# Patient Record
Sex: FEMALE | Race: BLACK OR AFRICAN AMERICAN | Employment: FULL TIME | URBAN - METROPOLITAN AREA
[De-identification: names, ages, dates, MRNs, and addresses within clinical notes are randomized per-mention and may not be internally consistent; named-entity substitution may affect disease eponyms.]

---

## 2017-07-20 PROBLEM — N91.2 AMENORRHEA: Status: ACTIVE | Noted: 2017-07-20

## 2018-04-19 PROBLEM — L03.90 CELLULITIS: Status: ACTIVE | Noted: 2018-04-19

## 2021-10-09 PROBLEM — R73.9 HYPERGLYCEMIA: Status: ACTIVE | Noted: 2021-10-09

## 2022-10-25 ENCOUNTER — TELEPHONE (OUTPATIENT)
Dept: INTERNAL MEDICINE | Age: 32
End: 2022-10-25

## 2022-10-25 NOTE — LETTER
[address] [address]  Phone: [phone]  Fax: [phone]    David Treadwell MD        October 25, 2022     [address] [place] [address]      Dear Silvester Riedel: We missed seeing you for a scheduled appointment at [address] [place] [address] with David Treadwell MD on 10/25/2022. We're sorry you were unable to keep your appointment and hope that you are doing well. We ask that you please call 24 hours in advance if you are unable to make your appointment, so that we can give that time to another patient in need. We care about you and the management of your healthcare and want to make sure that you follow up as recommended. To provide quality care and timely appointments to all our patients, you may be dismissed from the practice if you do not show for three (3) scheduled appointments within a 12-month period. We would like to continue treating your healthcare needs. Please call the office to reschedule your appointment, if needed.      Sincerely,        David Treadwell MD

## 2022-11-04 VITALS
OXYGEN SATURATION: 98 % | TEMPERATURE: 96.3 F | DIASTOLIC BLOOD PRESSURE: 94 MMHG | HEART RATE: 97 BPM | SYSTOLIC BLOOD PRESSURE: 156 MMHG | RESPIRATION RATE: 16 BRPM

## 2022-11-04 PROCEDURE — 99284 EMERGENCY DEPT VISIT MOD MDM: CPT

## 2022-11-05 ENCOUNTER — APPOINTMENT (OUTPATIENT)
Dept: CT IMAGING | Age: 32
End: 2022-11-05
Payer: COMMERCIAL

## 2022-11-05 ENCOUNTER — HOSPITAL ENCOUNTER (EMERGENCY)
Age: 32
Discharge: HOME | End: 2022-11-05
Attending: EMERGENCY MEDICINE
Payer: COMMERCIAL
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
## 2022-11-05 PROCEDURE — 70450 CT HEAD/BRAIN W/O DYE: CPT

## 2022-11-05 PROCEDURE — 6370000000 HC RX 637 (ALT 250 FOR IP): Performed by: STUDENT IN AN ORGANIZED HEALTH CARE EDUCATION/TRAINING PROGRAM

## 2022-11-05 RX ORDER — METOCLOPRAMIDE HYDROCHLORIDE 5 MG/ML
10 INJECTION INTRAMUSCULAR; INTRAVENOUS ONCE
Status: DISCONTINUED | OUTPATIENT
Start: 2022-11-05 | End: 2022-11-05

## 2022-11-05 RX ORDER — ACETAMINOPHEN 500 MG
1000 TABLET ORAL ONCE
Status: COMPLETED | OUTPATIENT
Start: 2022-11-05 | End: 2022-11-05

## 2022-11-05 RX ORDER — DIPHENHYDRAMINE HYDROCHLORIDE 50 MG/ML
25 INJECTION INTRAMUSCULAR; INTRAVENOUS ONCE
Status: DISCONTINUED | OUTPATIENT
Start: 2022-11-05 | End: 2022-11-05

## 2022-11-05 RX ADMIN — ACETAMINOPHEN 1000 MG: 500 TABLET ORAL at 01:27

## 2022-11-05 ASSESSMENT — PAIN DESCRIPTION - PAIN TYPE: TYPE: ACUTE PAIN

## 2022-11-05 ASSESSMENT — ENCOUNTER SYMPTOMS
ANAL BLEEDING: 0
DIARRHEA: 0
SHORTNESS OF BREATH: 0
RHINORRHEA: 0
NAUSEA: 0
SORE THROAT: 0
CONSTIPATION: 0
PHOTOPHOBIA: 0
VOMITING: 0
CHEST TIGHTNESS: 0
ABDOMINAL DISTENTION: 0

## 2022-11-05 ASSESSMENT — PAIN SCALES - GENERAL
PAINLEVEL_OUTOF10: 6
PAINLEVEL_OUTOF10: 8

## 2022-11-05 ASSESSMENT — PAIN DESCRIPTION - LOCATION
LOCATION: HEAD
LOCATION: HEAD

## 2022-11-05 ASSESSMENT — PAIN DESCRIPTION - DESCRIPTORS: DESCRIPTORS: THROBBING

## 2022-11-05 NOTE — ED PROVIDER NOTES
[facility] ED  Emergency Department Encounter  EmergencyMedicine Resident     Pt Becca Paulino  MRN: [id number]  [place] 1990  Date of evaluation: 11/5/22  PCP:  Chandan Schroeder MD    [address] [place] [address]       Chief Complaint   Patient presents with    Darnella Ready in bathroom yesterday night and hit right side of head on sink. Unsure of LOC. States feeling tired ever since. Fatigue       HISTORY OF PRESENT ILLNESS  (Location/Symptom, Timing/Onset, Context/Setting, Quality, Duration, Modifying Factors, Severity.)      Dale Demarco is a 27 y.o. female who presents with severe right-sided headache after a fall yesterday. Patient does not remember the fall very well. States she was on the bathroom and fell and hit her head she thinks she may have lost consciousness. She denies any nausea or vomiting. Denies anticoagulation use. Denies any loss strength or sensation or neck pain or pain anywhere else. She denies diplopia, dysphagia, dysarthria, dysmetria, ataxia    PAST MEDICAL / SURGICAL / SOCIAL / FAMILY HISTORY      has a past medical history of Anxiety, Difficult intravenous access, Gestational diabetes, Headache, History of stomach ulcers, Obesity, Pneumonia, Type 2 diabetes mellitus with hyperglycemia, without long-term current use of insulin ([facility] [address] Wears glasses, Wound disruption, post-op, skin, and Wound infection. has a past surgical history that includes cyst removal (Right, 03/30/2018); pr exc tumor soft tissue foot/toe subfasc <1.5cm (Right, 3/30/2018); pr office/outpt visit,procedure only (Right, 5/4/2018); Hand tendon surgery (Right, 06/12/2020); Hand surgery (Right, 06/12/2020); and Hand surgery (Right, 6/12/2020).       Social History     Socioeconomic History    Marital status:      Spouse name: Not on file    Number of children: Not on file    Years of education: Not on file    Highest education level: Not on file   Occupational History    Not on file   Tobacco Use    Smoking status: Former     Packs/day: 0.50     Years: 2.00     Pack years: 1.00     Types: Cigarettes     Start date: 2018     Quit date: 2020     Years since quittin.3    Smokeless tobacco: Never   Vaping Use    Vaping Use: Never used   Substance and Sexual Activity    Alcohol use: Yes     Comment: occasional     Drug use: No    Sexual activity: Not on file   Other Topics Concern    Not on file   Social History Narrative    Not on file     Social Determinants of Health     Financial Resource Strain: Not on file   Food Insecurity: Not on file   Transportation Needs: Not on file   Physical Activity: Not on file   Stress: Not on file   Social Connections: Not on file   Intimate Partner Violence: Not on file   Housing Stability: Not on file       Family History   Problem Relation Age of Onset    Diabetes Mother     Diabetes Father     High Blood Pressure Father     Hypertension Maternal Grandmother     Diabetes Maternal Grandmother        Allergies:  Aspirin, Motrin [ibuprofen], Nsaids, and Adhesive tape    Home Medications:  Prior to Admission medications    Medication Sig Start Date End Date Taking? Authorizing Provider   blood glucose monitor kit and supplies Dispense sufficient amount for indicated testing frequency plus additional to accommodate PRN testing needs. Dispense all needed supplies to include: monitor, strips, lancing device, lancets, control solutions, alcohol swabs.  22   Nandini Montalvo MD   blood glucose monitor strips Test__3_times daily    Diagnosis: 250.0   Diabetes Mellitus____Insulin Dependent____Non-Insulin Dependent 22   Cory Calixto MD   Lancets MISC 1 each by Does not apply route daily Use_3-4__times daily    Diagnisis:250.0  Diabetes Mellitus____Insulin Dependent 22   Cory Calixto MD   insulin lispro, 1 Unit Dial, 100 UNIT/ML SOPN inject PER SLIDING SCALE  FOR BLOOD GLUCOSE 125-150  2 UNITS 151-200 4 UNITS 201-250 6 UNITS 251-3008 UNITS 301-350 10 UNITS 351- 400 12 UNITS MAXIMUM DAILY DOSE OF 36 units 8/19/22   Cory Calixto MD   blood glucose test strips (FREESTYLE LITE) strip 1 each by In Vitro route daily As needed. 8/4/22   [address], MD   glucose monitoring (FREESTYLE FREEDOM) kit 1 kit by Does not apply route daily 8/3/22   Cory Calixto MD   FreeStyle Lancets MISC 1 each by Does not apply route daily 8/3/22   Cory Calixto MD   lisinopril (PRINIVIL;ZESTRIL) 5 MG tablet Take 0.5 tablets by mouth in the morning. Do not take if pregnancy is planned or suspected. . 7/19/22   Kelby Castellon MD   Dulaglutide (TRULICITY) 1.5 UN/4.7BU SOPN Inject 1.5 mg into the skin once a week 7/19/22   Kelby Castellon MD   insulin glargine (LANTUS SOLOSTAR) 100 UNIT/ML injection pen Inject 35 Units into the skin in the morning and 35 Units before bedtime.  7/19/22   Kelby Castellon MD   Continuous Blood Gluc Sensor (FREESTYLE YASMANY 14 DAY SENSOR) MISC 1 each by Does not apply route daily 7/19/22   Kelby Castellon MD   Continuous Blood Gluc  (FREESTYLE YASMANY 14 DAY READER) MIAN 1 each by Does not apply route daily 7/19/22   Kelby Castellon MD   Blood Pressure KIT As directed  Patient not taking: Reported on 7/19/2022 4/19/22   Kelby Castellon MD   metroNIDAZOLE (FLAGYL) 500 MG tablet  3/21/22   Historical Provider, MD   albuterol sulfate HFA (VENTOLIN HFA) 108 (90 Base) MCG/ACT inhaler Inhale 2 puffs into the lungs 4 times daily as needed for Wheezing 12/21/21   Erin Simental DO   blood glucose test strips (TRUE METRIX BLOOD GLUCOSE TEST) strip 1 each by In Vitro route 3 times daily To check blood glucose at least 3 times daily 12/7/21   Kavita Garces MD   Nutritional Supplements (GLUCOSE MANAGEMENT) TABS Take 1 each by mouth as needed (hypoglycemia)  Patient not taking: No sig reported 12/7/21   Kavita Garces MD   Insulin Pen Needle 29G X 12MM MISC 1 each by Does not apply route daily 11/8/21   Cory Alonso MD   loratadine (CLARITIN) 10 MG tablet Take 1 tablet by mouth daily 10/19/21   Leena Contreras MD   Lancets MISC 1 each by Does not apply route 2 times daily 7/29/21   Alex Rosa MD   SUMAtriptan (IMITREX) 100 MG tablet Take 1 tablet by mouth once as needed for Migraine (Take only for severe HA when Naprosyn does not alleviate the pain. To take on the onset of the migraine, may repeat after 1 hour. No more than 2 per day. No more than 4 days/week.) 1/28/21 4/19/22  Camilla Crawley MD   fluticasone [facility]) 50 MCG/ACT nasal spray 1 spray by Nasal route daily 1/3/18   Alex Rosa MD       REVIEW OF SYSTEMS    (2-9 systems for level 4, 10 or more for level 5)      Review of Systems   Constitutional:  Negative for chills and fever. HENT:  Negative for rhinorrhea and sore throat. Eyes:  Negative for photophobia. Respiratory:  Negative for chest tightness and shortness of breath. Cardiovascular:  Negative for chest pain. Gastrointestinal:  Negative for abdominal distention, anal bleeding, constipation, diarrhea, nausea and vomiting. Endocrine: Negative for polyuria. Genitourinary:  Negative for difficulty urinating and flank pain. Musculoskeletal:  Negative for arthralgias. Skin:  Negative for rash. Neurological:  Positive for headaches. Negative for dizziness, tremors, seizures, syncope, facial asymmetry, speech difficulty, weakness, light-headedness and numbness.      PHYSICAL EXAM   (up to 7 for level 4, 8 or more for level 5)      INITIAL VITALS:   BP (!) 156/94   Pulse 97   Temp (!) 96.3 °F (35.7 °C)   Resp 16   SpO2 98%     Physical Exam  HENT:      Head:      Comments: Mild right-sided temporal tenderness     Right Ear: Tympanic membrane normal.      Left Ear: Tympanic membrane normal.      Nose: Nose normal.      Mouth/Throat:      Mouth: Mucous membranes are moist.   Eyes:      Extraocular Course User Index  [ZE] Ayla Coe DO       PROCEDURES:  Procedures     CONSULTS:  None    CRITICAL CARE:  None    MDM  Patient with a fall and head injury CT scan obtained negative. Patient neurologically intact nontoxic discharged home    MIPS 415    The patient has one or more of the following conditions that are excluded from the measure (select all that apply) :  Patient has a ventricular shunt: No  Patient has a brain tumor: No  Patient has multi-system trauma: No  Patient is pregnant: No  Patient is taking an antiplatelet medication (excluding aspirin): No  Patient is 72years old or older: No  CT scan ordered for reasons other than trauma: No  A head CT was ordered, but not by an emergency care provider: No    Patient is 25 or older, presenting with minor blunt head trauma. Head CT (including cosigned orders) was ordered by an emergency care clinician for trauma because (select one or more): [Satisfies MIPS]    Patients GCS was less than 15: No  Focal neurological deficit: No  Severe Headache: Yes  Vomiting: No  Physical signs of a basilar skull fracture: No  Coagulopathy: No  Thrombocytopenia: No  Patient suspected of taking an anticoagulant medication: No  Severe or Dangerous mechanism of injury (Select one or more):    MVA with patient ejection, death of another passenger, rollover, speed > 40mph, airbag deployment,  or passenger on ATV or motorcycle: No   Pedestrian or bicyclist without helmet: struck by motorized vehicle, in bicycle crash: No  Fall > 3 feet or 5 stairs: Yes  Head struck by high-impact object (hammer, baseball, baseball bat, heavy object such as falling brick): No       Patient had loss of consciousness OR posttraumatic amnesia AND:   Headache: Yes  Patient is 61years old or older: No  Drug or Alcohol intoxication: No  Short-term memory deficits: No  Evidence of trauma above the clavicles (any visible or detected trauma to the head or neck, including lacerations, abrasions, bruising, swelling or fracture): No  Post-traumatic seizure: No        FINAL IMPRESSION      1.  Fall, initial encounter          DISPOSITION / PLAN     DISPOSITION Decision To Discharge 11/05/2022 02:37:06 AM      PATIENT REFERRED TO:  [facility]  [address] [place] [address]  [phone]    If symptoms worsen    DISCHARGE MEDICATIONS:  New Prescriptions    No medications on file       Grazyna Culp DO  Emergency Medicine Resident    (Please note that portions of thisnote were completed with a voice recognition program.  Efforts were made to edit the dictations but occasionally words are mis-transcribed.)        Korey Tello DO  Resident  11/05/22 [id number] Toyin Hughes DO  Resident  11/05/22 [id number]

## 2022-11-05 NOTE — ED NOTES
Pt reports fall yesterday at home, striking head on bathroom sink  Reports unknown LOC  States headache today and increased tiredness  +light sensitivity   Neuro x4 intact  +perrl x2  Significant other at bedside  Call light in reach  Lights dimmed for comfort     Gerardo Turner RN  11/05/22 0144

## 2022-11-05 NOTE — ED PROVIDER NOTES
[facility] ED     Emergency Department     Faculty Attestation        I performed a history and physical examination of the patient and discussed management with the resident. I reviewed the residents note and agree with the documented findings and plan of care. Any areas of disagreement are noted on the chart. I was personally present for the key portions of any procedures. I have documented in the chart those procedures where I was not present during the key portions. I have reviewed the emergency nurses triage note. I agree with the chief complaint, past medical history, past surgical history, allergies, medications, social and family history as documented unless otherwise noted below. For Physician Assistant/ Nurse Practitioner cases/documentation I have personally evaluated this patient and have completed at least one if not all key elements of the E/M (history, physical exam, and MDM). Additional findings are as noted. Vital Signs: BP: (!) 156/94  Heart Rate: 97  Resp: 16  Temp: (!) 96.3 °F (35.7 °C) SpO2: 98 %  PCP:  Martha Rm MD    Pertinent Comments:     Patient is a 61-year-old female who for the last 24 hours fell and hit her head with positive loss of consciousness. Was nauseated earlier but that is resolved however persistent headache specifically on the right has persisted. Denies any neck pain or numbness/tingling extremities or any loss of bowel or bladder function or weakness. No tenderness to palpation of the C-spine and lungs are clear to station bilateral with midline trachea with heart regular rate and rhythm abdomen soft/nontender. Neurologically patient is intact with strength 5/5 bilateral upper and lower extremities in both proximal and distal flexors and extensors. Sensation light touch intact bilaterally as well as finger-nose intact bilaterally.    Cranial nerves II through XII and normal bilaterally with no hemotympanum on examination either. Assessment/plan: Patient with head injury after mechanical fall and positive LOC. Will obtain CT head as well as attempt symptomatic control and reevaluate after. CT scan of the brain was ordered after minor head trauma of less than 24 hours with a GCS of 15 due to: Loss of consciousness and injury (bruises, laceration, abrasion, swelling) noted above the clavicle. Critical Care  None      (Please note that portions of this note were completed with a voice recognition program. Efforts were made to edit the dictations but occasionally words are mis-transcribed.  Whenever words are used in this note in any gender, they shall be construed as though they were used in the gender appropriate to the circumstances; and whenever words are used in this note in the singular or plural form, they shall be construed as though they were used in the form appropriate to the circumstances.)    MD Niurka Mckenzie  Attending Emergency Medicine Physician           Darleen Babin MD  11/05/22 2263

## 2022-11-05 NOTE — ED PROVIDER NOTES
Faculty Sign-Out Attestation  Handoff taken on the following patient from prior Attending Physician: Marlon Ovalle    I was available and discussed any additional care issues that arose and coordinated the management plans with the resident(s) caring for the patient during my duty period. Any areas of disagreement with residents documentation of care or procedures are noted on the chart. I was personally present for the key portions of any/all procedures during my duty period. I have documented in the chart those procedures where I was not present during the moreno portions.     Fall, head injury, ct head pending, >> if negative >> discharge    Krishna Wang, DO  Attending Physician       Krishna Wang, DO  11/05/22 0208    Ct head -     Krishna Wang, DO  11/05/22 0304